# Patient Record
Sex: FEMALE | Race: OTHER | HISPANIC OR LATINO | URBAN - METROPOLITAN AREA
[De-identification: names, ages, dates, MRNs, and addresses within clinical notes are randomized per-mention and may not be internally consistent; named-entity substitution may affect disease eponyms.]

---

## 2017-07-13 ENCOUNTER — EMERGENCY (EMERGENCY)
Facility: HOSPITAL | Age: 82
LOS: 1 days | Discharge: HOME | End: 2017-07-13
Attending: EMERGENCY MEDICINE | Admitting: EMERGENCY MEDICINE
Payer: SELF-PAY

## 2017-07-13 VITALS
HEART RATE: 81 BPM | OXYGEN SATURATION: 99 % | RESPIRATION RATE: 18 BRPM | DIASTOLIC BLOOD PRESSURE: 78 MMHG | SYSTOLIC BLOOD PRESSURE: 129 MMHG | TEMPERATURE: 98 F

## 2017-07-13 LAB
ALBUMIN SERPL ELPH-MCNC: 3.4 G/DL — SIGNIFICANT CHANGE UP (ref 3.3–5.2)
ALP SERPL-CCNC: 49 U/L — SIGNIFICANT CHANGE UP (ref 40–120)
ALT FLD-CCNC: 16 U/L — SIGNIFICANT CHANGE UP
ANION GAP SERPL CALC-SCNC: 12 MMOL/L — SIGNIFICANT CHANGE UP (ref 5–17)
APPEARANCE UR: ABNORMAL
AST SERPL-CCNC: 31 U/L — SIGNIFICANT CHANGE UP
BACTERIA # UR AUTO: ABNORMAL
BILIRUB SERPL-MCNC: 0.2 MG/DL — LOW (ref 0.4–2)
BILIRUB UR-MCNC: NEGATIVE — SIGNIFICANT CHANGE UP
BUN SERPL-MCNC: 10 MG/DL — SIGNIFICANT CHANGE UP (ref 8–20)
CALCIUM SERPL-MCNC: 8.4 MG/DL — LOW (ref 8.6–10.2)
CHLORIDE SERPL-SCNC: 95 MMOL/L — LOW (ref 98–107)
CO2 SERPL-SCNC: 24 MMOL/L — SIGNIFICANT CHANGE UP (ref 22–29)
COLOR SPEC: ABNORMAL
CREAT SERPL-MCNC: 0.41 MG/DL — LOW (ref 0.5–1.3)
DIFF PNL FLD: ABNORMAL
EPI CELLS # UR: SIGNIFICANT CHANGE UP
GLUCOSE SERPL-MCNC: 129 MG/DL — HIGH (ref 70–115)
GLUCOSE UR QL: NEGATIVE MG/DL — SIGNIFICANT CHANGE UP
HCT VFR BLD CALC: 29.9 % — LOW (ref 37–47)
HGB BLD-MCNC: 9.9 G/DL — LOW (ref 12–16)
KETONES UR-MCNC: NEGATIVE — SIGNIFICANT CHANGE UP
LACTATE BLDV-MCNC: 1.4 MMOL/L — SIGNIFICANT CHANGE UP (ref 0.5–2)
LEUKOCYTE ESTERASE UR-ACNC: ABNORMAL
MCHC RBC-ENTMCNC: 28.2 PG — SIGNIFICANT CHANGE UP (ref 27–31)
MCHC RBC-ENTMCNC: 33.1 G/DL — SIGNIFICANT CHANGE UP (ref 32–36)
MCV RBC AUTO: 85.2 FL — SIGNIFICANT CHANGE UP (ref 81–99)
NITRITE UR-MCNC: NEGATIVE — SIGNIFICANT CHANGE UP
PH UR: 7 — SIGNIFICANT CHANGE UP (ref 5–8)
PLATELET # BLD AUTO: 186 K/UL — SIGNIFICANT CHANGE UP (ref 150–400)
POTASSIUM SERPL-MCNC: 5.2 MMOL/L — SIGNIFICANT CHANGE UP (ref 3.5–5.3)
POTASSIUM SERPL-SCNC: 5.2 MMOL/L — SIGNIFICANT CHANGE UP (ref 3.5–5.3)
PROT SERPL-MCNC: 6.5 G/DL — LOW (ref 6.6–8.7)
PROT UR-MCNC: 15 MG/DL
RBC # BLD: 3.51 M/UL — LOW (ref 4.4–5.2)
RBC # FLD: 14.9 % — SIGNIFICANT CHANGE UP (ref 11–15.6)
RBC CASTS # UR COMP ASSIST: ABNORMAL /HPF (ref 0–4)
SODIUM SERPL-SCNC: 131 MMOL/L — LOW (ref 135–145)
SP GR SPEC: 1 — LOW (ref 1.01–1.02)
UROBILINOGEN FLD QL: 1 MG/DL
WBC # BLD: 7.1 K/UL — SIGNIFICANT CHANGE UP (ref 4.8–10.8)
WBC # FLD AUTO: 7.1 K/UL — SIGNIFICANT CHANGE UP (ref 4.8–10.8)
WBC UR QL: ABNORMAL

## 2017-07-13 PROCEDURE — 99284 EMERGENCY DEPT VISIT MOD MDM: CPT

## 2017-07-13 PROCEDURE — 71010: CPT | Mod: 26

## 2017-07-13 RX ORDER — SODIUM CHLORIDE 9 MG/ML
500 INJECTION INTRAMUSCULAR; INTRAVENOUS; SUBCUTANEOUS ONCE
Qty: 0 | Refills: 0 | Status: COMPLETED | OUTPATIENT
Start: 2017-07-13 | End: 2017-07-13

## 2017-07-13 RX ORDER — SODIUM CHLORIDE 9 MG/ML
3 INJECTION INTRAMUSCULAR; INTRAVENOUS; SUBCUTANEOUS ONCE
Qty: 0 | Refills: 0 | Status: COMPLETED | OUTPATIENT
Start: 2017-07-13 | End: 2017-07-13

## 2017-07-13 RX ORDER — SODIUM CHLORIDE 9 MG/ML
1000 INJECTION INTRAMUSCULAR; INTRAVENOUS; SUBCUTANEOUS
Qty: 0 | Refills: 0 | Status: DISCONTINUED | OUTPATIENT
Start: 2017-07-13 | End: 2017-07-17

## 2017-07-13 RX ORDER — TETANUS TOXOID, REDUCED DIPHTHERIA TOXOID AND ACELLULAR PERTUSSIS VACCINE, ADSORBED 5; 2.5; 8; 8; 2.5 [IU]/.5ML; [IU]/.5ML; UG/.5ML; UG/.5ML; UG/.5ML
0.5 SUSPENSION INTRAMUSCULAR ONCE
Qty: 0 | Refills: 0 | Status: COMPLETED | OUTPATIENT
Start: 2017-07-13 | End: 2017-07-13

## 2017-07-13 RX ORDER — ACETAMINOPHEN 500 MG
650 TABLET ORAL ONCE
Qty: 0 | Refills: 0 | Status: COMPLETED | OUTPATIENT
Start: 2017-07-13 | End: 2017-07-13

## 2017-07-13 RX ADMIN — SODIUM CHLORIDE 100 MILLILITER(S): 9 INJECTION INTRAMUSCULAR; INTRAVENOUS; SUBCUTANEOUS at 20:05

## 2017-07-13 RX ADMIN — SODIUM CHLORIDE 3 MILLILITER(S): 9 INJECTION INTRAMUSCULAR; INTRAVENOUS; SUBCUTANEOUS at 19:59

## 2017-07-13 RX ADMIN — SODIUM CHLORIDE 1000 MILLILITER(S): 9 INJECTION INTRAMUSCULAR; INTRAVENOUS; SUBCUTANEOUS at 20:05

## 2017-07-13 RX ADMIN — TETANUS TOXOID, REDUCED DIPHTHERIA TOXOID AND ACELLULAR PERTUSSIS VACCINE, ADSORBED 0.5 MILLILITER(S): 5; 2.5; 8; 8; 2.5 SUSPENSION INTRAMUSCULAR at 23:34

## 2017-07-13 NOTE — ED PROVIDER NOTE - MEDICAL DECISION MAKING DETAILS
PT FROM [facility] WITH FEVER, DECREASED APPETITE AND COUGH. SKIN AVULSION LLE. CHECK ING LABS, URINE, XRAY.

## 2017-07-13 NOTE — ED PROVIDER NOTE - OBJECTIVE STATEMENT
PATIENT PRESENTS AFTER SCRAPING LEG AND HAVING HEAVY BLEEDING. NOT DIABETIC. LAST TETANUS UNKNOWN. FAMILY SAYS SHE HAS NOT SLEPT WELL FOR A FEW NIGHTS AND HAS NOT EATEN WELL EITHER. STATES SHE JUST CAME FROM [facility] THREE DAYS AGO. SHE IS NOT ON ANY MEDICATION. NO DRINKING. NO SMOKING. PATIENT HAS MILD NON PRODUCTIVE COUGH. PATIENT DENIES DYSURIA.

## 2017-07-13 NOTE — ED ADULT NURSE NOTE - OBJECTIVE STATEMENT
[place] pt [place] with [place] extremity laceration and weakness, as per family pt is not eating and lost weight

## 2017-07-13 NOTE — ED PROVIDER NOTE - SHIFT CHANGE DETAILS
PT FROM [facility]  REPORTED FEVER, DECREASED APPETITE, COUGH BACK AND ABD PAIN  F/U WOUND CARE LLE, CXR, LAB, URINE. DISPO

## 2017-07-13 NOTE — ED PROVIDER NOTE - PROGRESS NOTE DETAILS
pt is feeling much better and advised to f/u with clinic for pneumonitis. pt appears well without complaints. will d/c. will tx for uti

## 2017-07-13 NOTE — ED ADULT NURSE REASSESSMENT NOTE - NS ED NURSE REASSESS COMMENT FT1
Assuming care from previous RN, ptAOx4, denies SOB, resp even and unlabored, LS clear and equal bilaterally, skin warm and dry, color good, patent 20G IV in wrist underside of forearm, denies n/v/pain, no obvious bleeding noted at this time, dressing on LLE clean and dry, family @ bedside, aware of plan of care, will continue to monitor. Assuming care from previous RN, ptAOx4, denies SOB, resp even and unlabored, LS clear and equal bilaterally, skin warm and dry, color good, patent 20G IV in wrist underside of forearm, denies n/v/pain, no obvious bleeding noted at this time, dressing on LLE clean and dry, family @ bedside, aware of plan of care, will continue to monitor. Interpretation by Giovana Oro.

## 2017-07-13 NOTE — ED PROVIDER NOTE - CARE PLAN
Principal Discharge DX:	Fever Principal Discharge DX:	Acute cystitis without hematuria  Secondary Diagnosis:	Pneumonitis

## 2017-07-14 VITALS
HEART RATE: 97 BPM | TEMPERATURE: 99 F | SYSTOLIC BLOOD PRESSURE: 112 MMHG | DIASTOLIC BLOOD PRESSURE: 74 MMHG | OXYGEN SATURATION: 100 % | RESPIRATION RATE: 20 BRPM

## 2017-07-14 PROCEDURE — 90471 IMMUNIZATION ADMIN: CPT

## 2017-07-14 PROCEDURE — 85027 COMPLETE CBC AUTOMATED: CPT

## 2017-07-14 PROCEDURE — 87186 SC STD MICRODIL/AGAR DIL: CPT

## 2017-07-14 PROCEDURE — 87086 URINE CULTURE/COLONY COUNT: CPT

## 2017-07-14 PROCEDURE — 81001 URINALYSIS AUTO W/SCOPE: CPT

## 2017-07-14 PROCEDURE — 87040 BLOOD CULTURE FOR BACTERIA: CPT

## 2017-07-14 PROCEDURE — 80053 COMPREHEN METABOLIC PANEL: CPT

## 2017-07-14 PROCEDURE — T1013: CPT

## 2017-07-14 PROCEDURE — 83605 ASSAY OF LACTIC ACID: CPT

## 2017-07-14 PROCEDURE — 99284 EMERGENCY DEPT VISIT MOD MDM: CPT | Mod: 25

## 2017-07-14 PROCEDURE — 90715 TDAP VACCINE 7 YRS/> IM: CPT

## 2017-07-14 PROCEDURE — 71045 X-RAY EXAM CHEST 1 VIEW: CPT

## 2017-07-14 PROCEDURE — 36415 COLL VENOUS BLD VENIPUNCTURE: CPT

## 2017-07-14 RX ORDER — CEFDINIR 250 MG/5ML
1 POWDER, FOR SUSPENSION ORAL
Qty: 6 | Refills: 0 | OUTPATIENT
Start: 2017-07-14

## 2017-07-19 LAB
CULTURE RESULTS: SIGNIFICANT CHANGE UP
CULTURE RESULTS: SIGNIFICANT CHANGE UP
SPECIMEN SOURCE: SIGNIFICANT CHANGE UP
SPECIMEN SOURCE: SIGNIFICANT CHANGE UP